# Patient Record
Sex: FEMALE | Race: WHITE | URBAN - METROPOLITAN AREA
[De-identification: names, ages, dates, MRNs, and addresses within clinical notes are randomized per-mention and may not be internally consistent; named-entity substitution may affect disease eponyms.]

---

## 2017-11-15 NOTE — PATIENT DISCUSSION
pt qualifies for all IOL choices, pt elects Basic IOL goal jennie. pt accepts need for spectacles for all focal points.

## 2020-08-13 ENCOUNTER — IMPORTED ENCOUNTER (OUTPATIENT)
Dept: URBAN - METROPOLITAN AREA CLINIC 50 | Facility: CLINIC | Age: 70
End: 2020-08-13

## 2020-09-09 ENCOUNTER — IMPORTED ENCOUNTER (OUTPATIENT)
Dept: URBAN - METROPOLITAN AREA CLINIC 50 | Facility: CLINIC | Age: 70
End: 2020-09-09

## 2020-09-16 ENCOUNTER — IMPORTED ENCOUNTER (OUTPATIENT)
Dept: URBAN - METROPOLITAN AREA CLINIC 50 | Facility: CLINIC | Age: 70
End: 2020-09-16

## 2020-11-02 ENCOUNTER — IMPORTED ENCOUNTER (OUTPATIENT)
Dept: URBAN - METROPOLITAN AREA CLINIC 50 | Facility: CLINIC | Age: 70
End: 2020-11-02

## 2020-11-02 NOTE — PATIENT DISCUSSION
"""Discussed with patient that she qualifies for bilateral blephorplasty w/ levator repair.  Patient ""

## 2021-04-17 ASSESSMENT — TONOMETRY
OS_IOP_MMHG: 16
OS_IOP_MMHG: 16
OD_IOP_MMHG: 16
OD_IOP_MMHG: 16

## 2021-04-17 ASSESSMENT — VISUAL ACUITY
OS_PH: @ 16 IN
OS_CC: 20/20
OD_OTHER: 20/25. 20/30.
OS_CC: J1+@ 16 IN
OD_CC: 20/20
OS_BAT: 20/20
OD_PH: @ 16 IN
OD_CC: 20/20
OD_BAT: 20/25
OS_OTHER: 20/20. 20/25.
OD_CC: J1+@ 16 IN
OS_CC: 20/20

## 2023-02-24 NOTE — PATIENT DISCUSSION
Artificial Tears: One drop to both eyes 3-4 times daily. We recommend Systane or Refresh lubricating eye drops which can be found at any pharmacy. How Severe Is Your Rash?: mild Is This A New Presentation, Or A Follow-Up?: Rash

## 2023-08-01 ENCOUNTER — PREPPED CHART (OUTPATIENT)
Dept: URBAN - METROPOLITAN AREA CLINIC 49 | Facility: LOCATION | Age: 73
End: 2023-08-01

## 2024-09-30 NOTE — PATIENT DISCUSSION
Vascular Referral Intake    Referred by: Rudi for abdominal/ wound    Specialty: Wound Clinic    Specific Provider if Necessary:  JAMES Geronimo, JAMES Albert, or MD Hai Becerra    Visit Type: New    Time Frame: Next Available    Testing/Imaging Needed Before Consult: none    Appt Note: (to be pasted into appt comments, also add where additional information is located, ie, outside images pushed to PACS, in Epic, sent to [facility], etc)   wound         monitor.